# Patient Record
Sex: FEMALE | Race: WHITE
[De-identification: names, ages, dates, MRNs, and addresses within clinical notes are randomized per-mention and may not be internally consistent; named-entity substitution may affect disease eponyms.]

---

## 2017-01-01 ENCOUNTER — HOSPITAL ENCOUNTER (EMERGENCY)
Dept: HOSPITAL 17 - PHED | Age: 67
Discharge: HOME | End: 2017-01-01
Payer: MEDICARE

## 2017-01-01 VITALS — HEIGHT: 63 IN | WEIGHT: 145.51 LBS | BODY MASS INDEX: 25.78 KG/M2

## 2017-01-01 VITALS
RESPIRATION RATE: 18 BRPM | DIASTOLIC BLOOD PRESSURE: 83 MMHG | OXYGEN SATURATION: 95 % | HEART RATE: 78 BPM | TEMPERATURE: 98.5 F | SYSTOLIC BLOOD PRESSURE: 157 MMHG

## 2017-01-01 DIAGNOSIS — B34.9: Primary | ICD-10-CM

## 2017-01-01 DIAGNOSIS — I10: ICD-10-CM

## 2017-01-01 DIAGNOSIS — J44.9: ICD-10-CM

## 2017-01-01 DIAGNOSIS — I48.91: ICD-10-CM

## 2017-01-01 PROCEDURE — 99283 EMERGENCY DEPT VISIT LOW MDM: CPT

## 2017-01-01 NOTE — PD
HPI


Chief Complaint:  Cold / Flu Symptoms


Time Seen by Provider:  17:14


Travel History


International Travel<30 days:  No


Contact w/Intl Traveler<30days:  No


Traveled to known affect area:  No





History of Present Illness


HPI


66-year-old female presents to the emergency Department with complaint of body 

aches, chills, cough, nasal congestion since yesterday.  Reports feeling hot 

but cannot report a MAXIMUM TEMPERATURE.  Has history of COPD.  Reports tobacco 

use daily.  Coughing up green/yellow phlegm.  Uses an inhaler daily for COPD.  

Has albuterol inhaler and has been using it with good relief.  Denies wheezing.

  Denies chest pain, shortness of breath.  Denies ear pain.  Reports throat 

irritation with cough.  Cough is worse at night.  Denies abdominal pain, nausea

, vomiting.  Has tried Benadryl with minimal relief of symptoms.  Is requesting 

antibiotics so she does not get pneumonia.  Dr. Vernon is primary care 

provider.  No other modifying factors or associated signs and symptoms.





PFSH


Past Medical History


Hx Anticoagulant Therapy:  Yes (ASA 81mg)


Arthritis:  No


Asthma:  No


Autoimmune Disease:  No


Anxiety:  Yes (PANIC ATTACKS)


Depression:  No


Heart Rhythm Problems:  Yes (ATRIAL FIBRILLATION)


Cancer:  No


Cardiac Catheterization:  Yes


Cardiovascular Problems:  Yes


High Cholesterol:  Yes


Chemotherapy:  No


Chest Pain:  Yes


Congestive Heart Failure:  No


COPD:  Yes


Cerebrovascular Accident:  No


Coronary Artery Disease:  No


Diabetes:  No


Diminished Hearing:  No


Endocrine:  No


Gastrointestinal Disorders:  No


GERD:  No


Genitourinary:  No


Headaches:  No


Hiatal Hernia:  No


Hypertension:  Yes


Immune Disorder:  No


Implanted Vascular Access Dvce:  No


Kidney Stones:  No


Musculoskeletal:  Yes


Neurologic:  No


Psychiatric:  Yes


Reproductive:  Yes (HYSTERECTOMY)


Respiratory:  Yes


Immunizations Current:  Yes


Migraines:  No


Radiation Therapy:  No


Renal Failure:  No


Seizures:  No


Sickle Cell Disease:  No


Sleep Apnea:  No


Thyroid Disease:  No


Ulcer:  No


Tetanus Vaccination:  < 5 Years


Influenza Vaccination:  Yes


Menopausal:  Yes





Past Surgical History


Abdominal Surgery:  No


AICD:  No


Arteriovenous Shunt:  No


Cardiac Surgery:  No


Coronary Stent:  Yes (X's 2)


Ear Surgery:  No


Endocrine Surgery:  No


Eye Surgery:  No


Genitourinary Surgery:  No


Gynecologic Surgery:  Yes (HYSTERECTOMY)


Hysterectomy:  Yes


Insulin Pump:  No


Joint Replacement:  No


Neurologic Surgery:  No


Oral Surgery:  No


Pacemaker:  No


Thoracic Surgery:  No


Other Surgery:  Yes (pylonidal cyst/NOSE RECONSTRUCTION)





Social History


Alcohol Use:  Yes (Soc.)


Tobacco Use:  Yes (1/2 PPD)


Substance Use:  No





Allergies-Medications


(Allergen,Severity, Reaction):  


Coded Allergies:  


     No Known Allergies (Unverified , 1/1/17)


Reported Meds & Prescriptions





Reported Meds & Active Scripts


Active


Tessalon Perles (Benzonatate) 100 Mg Cap 100 Mg PO TID PRN


Nasonex Nasal Spray (Mometasone Furoate) 50 Mcg/Act Naspr 2 [place] EACH NARE 

DAILY PRN


Deltasone (Prednisone) 20 Mg Tab 40 Mg PO DAILY 4 Days


     start 1/2/2017


Reported


Losartan (Losartan Potassium) 50 Mg Tab 50 Mg PO DAILY


Paroxetine (Paroxetine HCl) 10 Mg Tab 10 Mg PO BID


Hydrochlorothiazide 25 Mg Tab 25 Mg PO DAILY


Klor-Con 10 (Potassium Chloride) 10 Meq Tab 10 PO DAILY


Carvedilol 12.5 Mg Tab 12.5 Mg PO BID








Review of Systems


Except as stated in HPI:  all other systems reviewed are Neg





Physical Exam


Narrative


GENERAL: Well-nourished, well-developed  female patient, in no acute 

distress


SKIN: Warm and dry.  No rash.


HEAD: Atraumatic. Normocephalic. 


EYES: Pupils equal and round at 3 mm with brisk reaction. No scleral icterus. 

No injection or drainage.  PERRLA. 


ENT: Mucosa pink and moist. No erythema or exudates. No uvular edema. No uvular

, palatal, or tonsillar deviation.  Airway patent.  


EARS: Bilateral pinnae and external canals appear within normal limits.  

Bilateral tympanic membranes without  erythema, dullness or perforation. 


NECK: Trachea midline.  No lymphadenopathy.  


CARDIOVASCULAR: Regular rate and rhythm.  No murmur appreciated.   


RESPIRATORY: No accessory muscle use. Clear to auscultation. Breath sounds 

equal bilaterally. 


GASTROINTESTINAL: Abdomen soft, non-tender, nondistended. Hepatic and splenic 

margins not palpable.  Bowel sounds are active 4 quadrants.  


MUSCULOSKELETAL: No obvious deformities. No clubbing.  No cyanosis.  No edema. 


NEUROLOGICAL: Awake and alert.  Oriented 3.  No obvious cranial nerve 

deficits.  Motor grossly within normal limits. Normal speech.  Moves all 

extremities.  5/5 strength to all extremities.


PSYCHIATRIC: Appropriate mood and affect; insight and judgment normal.





Data


Data


Last Documented VS





Vital Signs








  Date Time  Temp Pulse Resp B/P Pulse Ox O2 Delivery O2 Flow Rate FiO2


 


1/1/17 14:14 98.5 78 18 157/83 95   








Orders





 Prednisone (Deltasone) (1/1/17 17:30)








MDM


Medical Decision Making


Medical Screen Exam Complete:  Yes


Emergency Medical Condition:  Yes


Medical Record Reviewed:  Yes


Differential Diagnosis


Viral illness, acute bronchitis, COPD exacerbation, influenza


Narrative Course


66-year-old female a chair and physical exam consistent with viral illness.  

She is afebrile and nontoxic-appearing.  She is in no acute distress and her 

lung sounds are clear and equal throughout.  Oxygen saturations 95% on room air 

and she is without retractions or tachypnea.  History of COPD.  Daily tobacco 

use.  Uses it daily inhaler and an albuterol inhaler for COPD.  Discussed viral 

illness and symptom management with patient; he showed requesting antibiotics 

for home.  I will give the patient a prescription for azithromycin as 

requested.  Patient has albuterol inhaler at home.  Deltasone administered in 

the ER.  Azithromycin, Deltasone, Nasonex, Tessalon Perles prescribed for home.

  Patient is medically cleared and stable for discharge.  Discussed reasons to 

return to the emergency department.  Instructed patient to follow up with 

primary care provider.  Patient agrees with treatment plan.  The patients vital 

signs are stable and the patient is stable for outpatient follow-up and 

treatment.  Patient discharged home, stable and in no acute distress.





Diagnosis





 Primary Impression:  


 Viral illness


Referrals:  


Primary Care Physician


Patient Instructions:  Acute Bronchitis (ED), Cold Symptoms (ED), General 

Instructions





***Additional Instructions:


Ibuprofen or Tylenol as directed and as needed to reduce fever


Use home inhalers as instructed and as needed for shortness of breath/wheezing


Oral steroids as prescribed


Over-the-counter antihistamines or decongestants as directed and as needed for 

symptom management


Get plenty of sleep/rest


Drink plenty of fluids to prevent dehydration


[place] diet to encourage nutrition such as crackers, fruit, applesauce, toast, 

soup etc.


Use an air humidifier/turn off ceiling fans


Follow-up with your primary care provider within 1-2 days


Return immediately to the emergency department with worsening of symptoms


***Med/Other Pt SpecificInfo:  Prescription(s) given


Scripts


Azithromycin 500 Mg [id number] Mg PO DAILY  #5 TAB  Ref 0


   Prov:Nessa Tobar ARNP         1/1/17 


Benzonatate (Tessalon Perles)100 Mg [id number] Mg PO TID PRN (COUGH) #21 CAP  Ref 0


   Prov:Nessa Tobar         1/1/17 


Mometasone Nasal Spray (Nasonex Nasal Spray)50 Mcg/Act Naspr2 [place] EACH NARE 

DAILY PRN (NASAL CONGESTION) #1 BOTTLE  Ref 0


   Prov:Nessa Tobar         1/1/17 


Prednisone (Deltasone)20 Mg Tab40 Mg PO DAILY  4 Days  Ref 0


   start 1/2/2017


   Prov:Nessa Tobar         1/1/17


Disposition:  01 DISCHARGE HOME


Condition:  Stable








Nessa Tobar Jan 1, 2017 17:18

## 2017-01-30 ENCOUNTER — HOSPITAL ENCOUNTER (EMERGENCY)
Dept: HOSPITAL 17 - PHED | Age: 67
Discharge: HOME | End: 2017-01-30
Payer: MEDICARE

## 2017-01-30 VITALS
OXYGEN SATURATION: 91 % | SYSTOLIC BLOOD PRESSURE: 157 MMHG | RESPIRATION RATE: 18 BRPM | DIASTOLIC BLOOD PRESSURE: 81 MMHG | HEART RATE: 86 BPM

## 2017-01-30 VITALS
RESPIRATION RATE: 18 BRPM | OXYGEN SATURATION: 92 % | DIASTOLIC BLOOD PRESSURE: 82 MMHG | SYSTOLIC BLOOD PRESSURE: 142 MMHG | HEART RATE: 91 BPM | TEMPERATURE: 98 F

## 2017-01-30 VITALS — HEIGHT: 64 IN | WEIGHT: 135.58 LBS | BODY MASS INDEX: 23.15 KG/M2

## 2017-01-30 VITALS
DIASTOLIC BLOOD PRESSURE: 92 MMHG | RESPIRATION RATE: 16 BRPM | SYSTOLIC BLOOD PRESSURE: 153 MMHG | OXYGEN SATURATION: 93 % | HEART RATE: 81 BPM

## 2017-01-30 VITALS — OXYGEN SATURATION: 94 %

## 2017-01-30 DIAGNOSIS — Z87.42: ICD-10-CM

## 2017-01-30 DIAGNOSIS — I10: ICD-10-CM

## 2017-01-30 DIAGNOSIS — J44.1: Primary | ICD-10-CM

## 2017-01-30 DIAGNOSIS — Z87.09: ICD-10-CM

## 2017-01-30 DIAGNOSIS — R05: ICD-10-CM

## 2017-01-30 DIAGNOSIS — Z86.59: ICD-10-CM

## 2017-01-30 DIAGNOSIS — Z86.79: ICD-10-CM

## 2017-01-30 DIAGNOSIS — Z87.39: ICD-10-CM

## 2017-01-30 DIAGNOSIS — E78.00: ICD-10-CM

## 2017-01-30 LAB
ALP SERPL-CCNC: 90 U/L (ref 45–117)
ALT SERPL-CCNC: 30 U/L (ref 10–53)
ANION GAP SERPL CALC-SCNC: 9 MEQ/L (ref 5–15)
AST SERPL-CCNC: 18 U/L (ref 15–37)
BASOPHILS # BLD AUTO: 0.1 TH/MM3 (ref 0–0.2)
BASOPHILS NFR BLD: 0.8 % (ref 0–2)
BILIRUB SERPL-MCNC: 0.6 MG/DL (ref 0.2–1)
BUN SERPL-MCNC: 17 MG/DL (ref 7–18)
CHLORIDE SERPL-SCNC: 97 MEQ/L (ref 98–107)
EOSINOPHIL # BLD: 0 TH/MM3 (ref 0–0.4)
EOSINOPHIL NFR BLD: 0.4 % (ref 0–4)
ERYTHROCYTE [DISTWIDTH] IN BLOOD BY AUTOMATED COUNT: 12.3 % (ref 11.6–17.2)
GFR SERPLBLD BASED ON 1.73 SQ M-ARVRAT: 75 ML/MIN (ref 89–?)
HCO3 BLD-SCNC: 30.9 MEQ/L (ref 21–32)
HCT VFR BLD CALC: 56.5 % (ref 35–46)
HEMO FLAGS: (no result)
LYMPHOCYTES # BLD AUTO: 1.9 TH/MM3 (ref 1–4.8)
LYMPHOCYTES NFR BLD AUTO: 21.8 % (ref 9–44)
MCH RBC QN AUTO: 33.1 PG (ref 27–34)
MCHC RBC AUTO-ENTMCNC: 33.5 % (ref 32–36)
MCV RBC AUTO: 98.9 FL (ref 80–100)
MONOCYTES NFR BLD: 11.4 % (ref 0–8)
NEUTROPHILS # BLD AUTO: 5.6 TH/MM3 (ref 1.8–7.7)
NEUTROPHILS NFR BLD AUTO: 65.6 % (ref 16–70)
PLATELET # BLD: 213 TH/MM3 (ref 150–450)
POTASSIUM SERPL-SCNC: 3.9 MEQ/L (ref 3.5–5.1)
RBC # BLD AUTO: 5.71 MIL/MM3 (ref 4–5.3)
SODIUM SERPL-SCNC: 137 MEQ/L (ref 136–145)
WBC # BLD AUTO: 8.6 TH/MM3 (ref 4–11)

## 2017-01-30 PROCEDURE — 94640 AIRWAY INHALATION TREATMENT: CPT

## 2017-01-30 PROCEDURE — 84484 ASSAY OF TROPONIN QUANT: CPT

## 2017-01-30 PROCEDURE — 71020: CPT

## 2017-01-30 PROCEDURE — 83880 ASSAY OF NATRIURETIC PEPTIDE: CPT

## 2017-01-30 PROCEDURE — 99285 EMERGENCY DEPT VISIT HI MDM: CPT

## 2017-01-30 PROCEDURE — 80053 COMPREHEN METABOLIC PANEL: CPT

## 2017-01-30 PROCEDURE — 85025 COMPLETE CBC W/AUTO DIFF WBC: CPT

## 2017-01-30 PROCEDURE — 94664 DEMO&/EVAL PT USE INHALER: CPT

## 2017-01-30 PROCEDURE — 96374 THER/PROPH/DIAG INJ IV PUSH: CPT

## 2017-01-30 RX ADMIN — IPRATROPIUM BROMIDE AND ALBUTEROL SULFATE SCH AMPULE: .5; 3 SOLUTION RESPIRATORY (INHALATION) at 10:36

## 2017-01-30 NOTE — PD
HPI


Chief Complaint:  Respiratory Symptoms


Time Seen by Provider:  10:10


Travel History


International Travel<30 days:  No


Contact w/Intl Traveler<30days:  No


Traveled to known affect area:  No





History of Present Illness


HPI


Patient is a 66-year-old female who comes in complaining of shortness of 

breath.  She has had 2 courses of antibiotics over the past month, most 

recently finishing a Z-Hair about a week ago.  She says she also finished a 

course of steroids last week.  She has history of COPD and has been taking her 

medications as directed.  She last used her nebulizer last night.  She says she 

woke up this morning and checked her pulse ox which read at 88-91%, and this 

made her nervous.  She says overall she has been feeling better, she has had 

decreased coughing, but she still feels short of breath.  She denies any fever 

or chills.  She denies any leg swelling.  She denies any chest pain.





PFSH


Past Medical History


Hx Anticoagulant Therapy:  No


Arthritis:  No


Asthma:  No


Autoimmune Disease:  No


Anxiety:  Yes (Panic attacks)


Depression:  No


Heart Rhythm Problems:  Yes (AF)


Cancer:  No


Cardiac Catheterization:  Yes


Cardiovascular Problems:  Yes (HTN)


High Cholesterol:  Yes


Chemotherapy:  No


Chest Pain:  Yes


Congestive Heart Failure:  No


COPD:  Yes


Cerebrovascular Accident:  No


Coronary Artery Disease:  No


Diabetes:  No


Diminished Hearing:  No


Endocrine:  No


Gastrointestinal Disorders:  No


GERD:  No


Genitourinary:  No


Headaches:  No


Hiatal Hernia:  No


Hypertension:  Yes


Immune Disorder:  No


Implanted Vascular Access Dvce:  No


Kidney Stones:  No


Musculoskeletal:  Yes


Neurologic:  No


Psychiatric:  Yes


Reproductive:  Yes (HYSTERECTOMY)


Respiratory:  Yes (COPD)


Immunizations Current:  Yes


Migraines:  No


Radiation Therapy:  No


Renal Failure:  No


Seizures:  No


Sickle Cell Disease:  No


Sleep Apnea:  No


Thyroid Disease:  No


Ulcer:  No


Tetanus Vaccination:  < 5 Years


Influenza Vaccination:  Yes


Pregnant?:  Not Pregnant


Menopausal:  Yes





Past Surgical History


Abdominal Surgery:  No


AICD:  No


Arteriovenous Shunt:  No


Cardiac Surgery:  No


Coronary Stent:  Yes (X's 2)


Ear Surgery:  No


Endocrine Surgery:  No


Eye Surgery:  No


Genitourinary Surgery:  No


Gynecologic Surgery:  Yes (HYSTERECTOMY)


Hysterectomy:  Yes


Insulin Pump:  No


Joint Replacement:  No


Neurologic Surgery:  No


Oral Surgery:  No


Pacemaker:  No


Thoracic Surgery:  No


Other Surgery:  Yes (Pilonidal cyst removal, nose recon. )





Social History


Alcohol Use:  Yes (Occ.)


Tobacco Use:  No


Substance Use:  No





Allergies-Medications


(Allergen,Severity, Reaction):  


Coded Allergies:  


     No Known Allergies (Unverified , 1/30/17)


Reported Meds & Prescriptions





Reported Meds & Active Scripts


Active


Medrol Dosepak (Methylprednisolone) 4 Mg Dspk 4 Mg PO AS DIRECTED


     Per Pharmacist direction


Reported


Wellbutrin SR 12 HR (Bupropion HCl) 100 Mg Tab 100 Mg PO Q12HR


Losartan (Losartan Potassium) 50 Mg Tab 50 Mg PO DAILY


Hydrochlorothiazide 25 Mg Tab 25 Mg PO DAILY


Klor-Con 10 (Potassium Chloride) 10 Meq Tab 10 PO DAILY


Carvedilol 12.5 Mg Tab 12.5 Mg PO BID








Review of Systems


Except as stated in HPI:  all other systems reviewed are Neg


General / Constitutional:  No: Fever, Chills


HENT:  No: Headaches, Lightheadedness


Cardiovascular:  No: Chest Pain or Discomfort


Respiratory:  Positive: Shortness of Breath,  No: Cough


Gastrointestinal:  No: Nausea, Vomiting


Genitourinary:  No: Dysuria


Musculoskeletal:  No: Edema, Pain


Skin:  No Rash, No Change in Pigmentation


Neurologic:  No: Weakness, Dizziness





Physical Exam


Narrative


GENERAL: Awake and alert in no acute distress.


SKIN: Warm and dry.


HEAD: Atraumatic. Normocephalic. 


EYES: Pupils equal and round. No scleral icterus. 


ENT: Mucous membranes pink and moist.


NECK: Trachea midline. No JVD. 


CARDIOVASCULAR: Regular rate and rhythm.  No murmur appreciated.


RESPIRATORY: No accessory muscle use.  Breath sounds decreased bilaterally.  No 

rales or crackles.


GASTROINTESTINAL: Abdomen soft, non-tender, nondistended. 


MUSCULOSKELETAL: No obvious deformities. No clubbing.  No cyanosis.  No edema. 


NEUROLOGICAL: Awake and alert. No obvious cranial nerve deficits.  Motor 

grossly within normal limits. Normal speech.


PSYCHIATRIC: Appropriate mood and affect; insight and judgment normal.





Data


Data


Last Documented VS





Vital Signs








  Date Time  Temp Pulse Resp B/P Pulse Ox O2 Delivery O2 Flow Rate FiO2


 


1/30/17 12:35  81 16 153/92 93 Room Air  


 


1/30/17 11:15       2 


 


1/30/17 09:46 98.0       








Orders





 Complete Blood Count With Diff (1/30/17 10:25)


Comprehensive Metabolic Panel (1/30/17 10:25)


B-Type Natriuretic Peptide (1/30/17 10:25)


Troponin I (1/30/17 10:25)


Iv Access Insert/Monitor (1/30/17 10:25)


Ecg Monitoring (1/30/17 10:25)


Oximetry (1/30/17 10:25)


Oxygen Administration (1/30/17 10:25)


Chest, Pa & Lat (1/30/17 10:25)


Sodium Chloride 0.9% Flush (Ns Flush) (1/30/17 10:30)


Methylprednisolone So Succ Inj (Solumedr (1/30/17 10:30)


Albuterol-Ipratropium Neb (Duoneb Neb) (1/30/17 10:30)





Labs





 Laboratory Tests








Test 1/30/17





 10:40


 


White Blood Count 8.6 TH/MM3


 


Red Blood Count 5.71 MIL/MM3


 


Hemoglobin 18.9 GM/DL


 


Hematocrit 56.5 %


 


Mean Corpuscular Volume 98.9 FL


 


Mean Corpuscular Hemoglobin 33.1 PG


 


Mean Corpuscular Hemoglobin 33.5 %





Concent 


 


Red Cell Distribution Width 12.3 %


 


Platelet Count 213 TH/MM3


 


Mean Platelet Volume 8.4 FL


 


Neutrophils (%) (Auto) 65.6 %


 


Lymphocytes (%) (Auto) 21.8 %


 


Monocytes (%) (Auto) 11.4 %


 


Eosinophils (%) (Auto) 0.4 %


 


Basophils (%) (Auto) 0.8 %


 


Neutrophils # (Auto) 5.6 TH/MM3


 


Lymphocytes # (Auto) 1.9 TH/MM3


 


Monocytes # (Auto) 1.0 TH/MM3


 


Eosinophils # (Auto) 0.0 TH/MM3


 


Basophils # (Auto) 0.1 TH/MM3


 


CBC Comment DIFF FINAL 


 


Differential Comment  


 


Sodium Level 137 MEQ/L


 


Potassium Level 3.9 MEQ/L


 


Chloride Level 97 MEQ/L


 


Carbon Dioxide Level 30.9 MEQ/L


 


Anion Gap 9 MEQ/L


 


Blood Urea Nitrogen 17 MG/DL


 


Creatinine 0.77 MG/DL


 


Estimat Glomerular Filtration 75 ML/MIN





Rate 


 


Random Glucose 132 MG/DL


 


Calcium Level 9.5 MG/DL


 


Total Bilirubin 0.6 MG/DL


 


Aspartate Amino Transf 18 U/L





(AST/SGOT) 


 


Alanine Aminotransferase 30 U/L





(ALT/SGPT) 


 


Alkaline Phosphatase 90 U/L


 


Troponin I LESS THAN 0.02





 NG/ML


 


B-Type Natriuretic Peptide 33 PG/ML


 


Total Protein 8.4 GM/DL


 


Albumin 4.3 GM/DL











MDM


Medical Decision Making


Medical Screen Exam Complete:  Yes


Emergency Medical Condition:  Yes


Medical Record Reviewed:  Yes


Differential Diagnosis


COPD exacerbation versus pneumonia versus bronchitis versus anxiety


Narrative Course


Patient is a 66-year-old female who comes in complaining of shortness of 

breath.  Exam shows decreased at sounds both lungs.  IV established, labs sent.

  Chest x-ray performed shows hyperinflated lungs consistent with COPD.





Labs show a hemoglobin of 18, which is also consistent with COPD.





Patient given 3 duo nebs as well as a dose of Solu-Medrol.  She reports feeling 

much better.  Oxygen saturation has ranged from 92-95% on room air.





I spoke with the patient's primary care physician, Dr. Vernon, he will see her 

in the office tomorrow morning.  Patient will be given prescription for Medrol 

Dosepak.  Advised follow-up with Dr. Vernon tomorrow.  Advised to return to 

the ED at any time if her symptoms are worsening.





Diagnosis





 Primary Impression:  


 COPD exacerbation


Patient Instructions:  COPD (Chronic Obstructive Pulmonary Disease) (ED), 

General Instructions





***Additional Instructions:


Follow up with Dr. Vernon tomorrow morning.  Use your nebulizer every 4 hours 

for the next day.  Take the steroids starting tomorrow as you already had a 

dose today.  Return to the ED as needed for any worsening symptoms.


Scripts


Methylprednisolone Dosepak (Medrol Dosepak)4 Mg Dspk4 Mg PO AS DIRECTED  #1 

DSPK  Ref 0


   Per Pharmacist direction


   Prov:Wendy Hobson MD         1/30/17


Disposition:  01 DISCHARGE HOME


Condition:  Stable








Wendy Hobson MD Jan 30, 2017 12:26

## 2017-01-30 NOTE — RADHPO
EXAM DATE/TIME:  01/30/2017 11:23 

 

[facility] COMPARISON:     

CHEST SINGLE AP, April 02, 2016, 0:00.

 

                     

INDICATIONS :     

Short of breath

                     

 

MEDICAL HISTORY :     

Chronic obstructive pulmonary disease.          

 

SURGICAL HISTORY :        

Cardiac stent x 2

 

ENCOUNTER:     

Initial                                        

 

ACUITY:     

1 month      

 

PAIN SCORE:     

0/10

 

LOCATION:     

Bilateral chest 

 

FINDINGS:     

PA and lateral views of the chest demonstrate the lungs to be hyperaerated consistent with COPD witho
ut evidence of mass, infiltrate or effusion.  The cardiomediastinal contours are unremarkable.  [place]
us structures are intact.

 

CONCLUSION:     

Hyperaeration consistent with COPD. No acute cardiopulmonary process

 

 

 

 Mohan Pedraza MD on January 30, 2017 at 11:35           

Board Certified Radiologist.

 This report was verified electronically.

## 2017-11-19 ENCOUNTER — HOSPITAL ENCOUNTER (EMERGENCY)
Dept: HOSPITAL 17 - PHEFT | Age: 67
Discharge: HOME | End: 2017-11-19
Payer: MEDICARE

## 2017-11-19 VITALS — BODY MASS INDEX: 26.17 KG/M2 | WEIGHT: 147.71 LBS | HEIGHT: 63 IN

## 2017-11-19 VITALS
OXYGEN SATURATION: 95 % | SYSTOLIC BLOOD PRESSURE: 175 MMHG | HEART RATE: 80 BPM | RESPIRATION RATE: 16 BRPM | DIASTOLIC BLOOD PRESSURE: 77 MMHG | TEMPERATURE: 98.2 F

## 2017-11-19 DIAGNOSIS — J44.9: Primary | ICD-10-CM

## 2017-11-19 DIAGNOSIS — Z72.0: ICD-10-CM

## 2017-11-19 DIAGNOSIS — I10: ICD-10-CM

## 2017-11-19 PROCEDURE — 99284 EMERGENCY DEPT VISIT MOD MDM: CPT

## 2017-11-19 NOTE — PD
HPI


Chief Complaint:  Cold / Flu Symptoms


Time Seen by Provider:  12:36


Travel History


International Travel<30 days:  No


Contact w/Intl Traveler<30days:  No


Traveled to known affect area:  No





History of Present Illness


HPI


66 old female with history of COPD here for evaluation of productive cough 3 

days.  Patient reports symptoms started approximately 5-7 days ago has mild URI 

symptoms and progressed to this harsh cough.  She reports chills and mild 

shortness of breath with coughing episodes.  Symptom severity is moderate.  No 

alleviating factors.





PFSH


Past Medical History


Hx Anticoagulant Therapy:  No


Arthritis:  No


Asthma:  No


Autoimmune Disease:  No


Anxiety:  Yes (Panic attacks)


Depression:  No


Heart Rhythm Problems:  Yes (AF)


Cancer:  No


Cardiac Catheterization:  Yes


Cardiovascular Problems:  Yes (HTN)


High Cholesterol:  Yes


Chemotherapy:  No


Chest Pain:  Yes


Congestive Heart Failure:  No


COPD:  Yes


Cerebrovascular Accident:  No


Coronary Artery Disease:  No


Diabetes:  No


Diminished Hearing:  No


Endocrine:  No


Gastrointestinal Disorders:  No


GERD:  No


Genitourinary:  No


Headaches:  No


Hiatal Hernia:  No


Hypertension:  Yes


Immune Disorder:  No


Implanted Vascular Access Dvce:  No


Kidney Stones:  No


Musculoskeletal:  Yes


Neurologic:  No


Psychiatric:  Yes


Reproductive:  Yes (HYSTERECTOMY)


Respiratory:  Yes (COPD)


Immunizations Current:  Yes


Migraines:  No


Radiation Therapy:  No


Renal Failure:  No


Seizures:  No


Sickle Cell Disease:  No


Sleep Apnea:  No


Thyroid Disease:  No


Ulcer:  No


Influenza Vaccination:  Yes


Pregnant?:  Not Pregnant


Menopausal:  Yes





Past Surgical History


Abdominal Surgery:  No


AICD:  No


Arteriovenous Shunt:  No


Cardiac Surgery:  No


Coronary Stent:  Yes (X's 2)


Ear Surgery:  No


Endocrine Surgery:  No


Eye Surgery:  No


Genitourinary Surgery:  No


Gynecologic Surgery:  Yes (HYSTERECTOMY)


Hysterectomy:  Yes


Insulin Pump:  No


Joint Replacement:  No


Neurologic Surgery:  No


Oral Surgery:  No


Pacemaker:  No


Thoracic Surgery:  No


Other Surgery:  Yes (Pilonidal cyst removal, nose recon. )





Social History


Alcohol Use:  Yes (Occ.)


Tobacco Use:  Yes


Substance Use:  No





Allergies-Medications


(Allergen,Severity, Reaction):  


Coded Allergies:  


     No Known Allergies (Unverified  Adverse Reaction, Unknown, 11/19/17)


Reported Meds & Prescriptions





Reported Meds & Active Scripts


Active


Reported


Wellbutrin SR 12 HR (Bupropion HCl) 100 Mg Tab 100 Mg PO Q12HR


Losartan (Losartan Potassium) 50 Mg Tab 50 Mg PO DAILY


Hydrochlorothiazide 25 Mg Tab 25 Mg PO DAILY


Klor-Con 10 (Potassium Chloride) 10 Meq Tab 10 PO DAILY


Carvedilol 12.5 Mg Tab 12.5 Mg PO BID








Review of Systems


Except as stated in HPI:  all other systems reviewed are Neg


General / Constitutional:  Positive: Chills


Respiratory:  Positive: Cough





Physical Exam


Narrative


GENERAL: Well-nourished, well-developed patient.


SKIN: Focused skin assessment warm/dry.


HEAD: Normocephalic.


EYES: No scleral icterus. No injection or drainage. 


NECK: Supple, trachea midline. No JVD or lymphadenopathy.


CARDIOVASCULAR: Regular rate and rhythm without murmurs, gallops, or rubs. 


RESPIRATORY: Breath sounds equal bilaterally. No accessory muscle use.  Harsh 

sounding cough with questionable rhonchi


GASTROINTESTINAL: Abdomen soft, non-tender, nondistended.





Data


Data


Last Documented VS





Vital Signs








  Date Time  Temp Pulse Resp B/P (MAP) Pulse Ox O2 Delivery O2 Flow Rate FiO2


 


11/19/17 12:13 98.2 80 16 175/77 (109) 95   











MDM


Medical Decision Making


Medical Screen Exam Complete:  Yes


Emergency Medical Condition:  Yes


Differential Diagnosis


Pneumonia, bronchitis, influenza


Narrative Course


66-year-old female with history COPD here with productive cough and chills 3 

days.  On exam patient has harsh sounding cough and question rhonchi.  Her 

vital signs are stable.  She is nontoxic appearing.





Diagnosis





 Primary Impression:  


 Bronchitis


Referrals:  


Primary Care Physician


Scripts


Prednisone (Prednisone) 20 Mg Tab


40 MG PO DAILY, #10 TAB 0 Refills


   Take 40 mg (2 tablets) daily for 5 days


   Prov: Rachel Herr         11/19/17 


Azithromycin (Azithromycin) 250 Mg Tab


250 MG PO AS DIRECTED for Infection, #6 TAB 0 Refills


   Take 2 tabs (500 mg) on day 1 then 1 tab daily x 4 days.


   Prov: Rachel Herr         11/19/17


Disposition:  01 DISCHARGE HOME


Condition:  Stable











Rachel Herr Nov 19, 2017 12:59

## 2018-03-28 ENCOUNTER — HOSPITAL ENCOUNTER (EMERGENCY)
Dept: HOSPITAL 17 - PHED | Age: 68
Discharge: HOME | End: 2018-03-28
Payer: MEDICARE

## 2018-03-28 VITALS
DIASTOLIC BLOOD PRESSURE: 76 MMHG | OXYGEN SATURATION: 98 % | SYSTOLIC BLOOD PRESSURE: 163 MMHG | RESPIRATION RATE: 16 BRPM | TEMPERATURE: 99.3 F | HEART RATE: 83 BPM

## 2018-03-28 VITALS — HEIGHT: 63 IN | BODY MASS INDEX: 24.92 KG/M2 | WEIGHT: 140.65 LBS

## 2018-03-28 DIAGNOSIS — Z79.899: ICD-10-CM

## 2018-03-28 DIAGNOSIS — F43.10: ICD-10-CM

## 2018-03-28 DIAGNOSIS — I10: ICD-10-CM

## 2018-03-28 DIAGNOSIS — F17.200: ICD-10-CM

## 2018-03-28 DIAGNOSIS — J44.9: ICD-10-CM

## 2018-03-28 DIAGNOSIS — M70.62: Primary | ICD-10-CM

## 2018-03-28 DIAGNOSIS — I25.2: ICD-10-CM

## 2018-03-28 DIAGNOSIS — I48.91: ICD-10-CM

## 2018-03-28 DIAGNOSIS — E78.00: ICD-10-CM

## 2018-03-28 PROCEDURE — 73502 X-RAY EXAM HIP UNI 2-3 VIEWS: CPT

## 2018-03-28 PROCEDURE — 96372 THER/PROPH/DIAG INJ SC/IM: CPT

## 2018-03-28 PROCEDURE — 99283 EMERGENCY DEPT VISIT LOW MDM: CPT

## 2018-03-28 NOTE — RADRPT
EXAM DATE/TIME:  03/28/2018 17:28 

 

[facility] COMPARISON:     

No previous studies available for comparison.

 

                     

INDICATIONS :     

Left hip pain for 3 weeks with no known injury

                     

 

MEDICAL HISTORY :     

None.          

 

SURGICAL HISTORY :     

None.   

 

ENCOUNTER:     

Initial                                        

 

ACUITY:     

3 weeks      

 

PAIN SCORE:     

10/10

 

LOCATION:     

Left  entire hip

 

FINDINGS:     

Examination of the left hip was performed with AP Pelvis.  The primary and secondary trabecular patte
rn of the femoral neck is intact.  The hip joint is of normal width without significant sclerosis or 
bony hypertrophy.  The acetabulum is grossly intact. There is good alignment of the SI joints and pub
ic symphysis. No arthropathy is demonstrated.

 

CONCLUSION:     

Unremarkable exam for patient's age.

 

 

 

 Logan Mann MD on March 28, 2018 at 17:40           

Board Certified Radiologist.

 This report was verified electronically.

## 2018-05-26 ENCOUNTER — HOSPITAL ENCOUNTER (EMERGENCY)
Dept: HOSPITAL 17 - PHED | Age: 68
Discharge: HOME | End: 2018-05-26
Payer: MEDICARE

## 2018-05-26 VITALS — HEIGHT: 63 IN | BODY MASS INDEX: 25.39 KG/M2 | WEIGHT: 143.3 LBS

## 2018-05-26 VITALS
OXYGEN SATURATION: 95 % | DIASTOLIC BLOOD PRESSURE: 73 MMHG | TEMPERATURE: 98.4 F | RESPIRATION RATE: 16 BRPM | HEART RATE: 85 BPM | SYSTOLIC BLOOD PRESSURE: 143 MMHG

## 2018-05-26 DIAGNOSIS — E78.00: ICD-10-CM

## 2018-05-26 DIAGNOSIS — I25.2: ICD-10-CM

## 2018-05-26 DIAGNOSIS — J44.9: ICD-10-CM

## 2018-05-26 DIAGNOSIS — I10: ICD-10-CM

## 2018-05-26 DIAGNOSIS — F41.0: ICD-10-CM

## 2018-05-26 DIAGNOSIS — F43.10: ICD-10-CM

## 2018-05-26 DIAGNOSIS — F17.200: ICD-10-CM

## 2018-05-26 DIAGNOSIS — I48.91: ICD-10-CM

## 2018-05-26 DIAGNOSIS — J01.00: Primary | ICD-10-CM

## 2018-05-26 PROCEDURE — 99283 EMERGENCY DEPT VISIT LOW MDM: CPT

## 2018-05-26 NOTE — PD
HPI


Chief Complaint:  ENT Complaint


Time Seen by Provider:  14:26


Travel History


International Travel<30 days:  No


Contact w/Intl Traveler<30days:  No


Traveled to known affect area:  No





History of Present Illness


HPI


67-year-old female complains of facial pain facial pressure postnasal drip, 

earache sore throat coughing congestion.  Patient states that his symptoms 

started about a week ago.  Patient denies any fever chills.  Patient states her 

cough is intermittent and mildly productive.  Patient denies any chest pain 

shortness of breath.  Patient denies abdominal pain.  Patient denies any nausea 

vomiting diarrhea.





PFSH


Past Medical History


Hx Anticoagulant Therapy:  No


Arthritis:  No


Asthma:  No


Autoimmune Disease:  No


Anxiety:  Yes (Panic attacks)


Depression:  No


Heart Rhythm Problems:  Yes (AF)


Cancer:  No


Cardiac Catheterization:  Yes


Cardiovascular Problems:  Yes (MI)


High Cholesterol:  Yes


Chemotherapy:  No


Chest Pain:  Yes


Congestive Heart Failure:  No


COPD:  Yes


Cerebrovascular Accident:  No


Coronary Artery Disease:  No


Diabetes:  No


Diminished Hearing:  No


Endocrine:  No


Gastrointestinal Disorders:  No


GERD:  No


Genitourinary:  No


Headaches:  No


Hiatal Hernia:  No


Heparin Induced Thrombocytopen:  No


Hypertension:  Yes


Immune Disorder:  No


Implanted Vascular Access Dvce:  No


Kidney Stones:  No


Musculoskeletal:  Yes


Neurologic:  No


Psychiatric:  Yes (PTSD)


Reproductive:  Yes (HYSTERECTOMY)


Respiratory:  Yes (COPD)


Immunizations Current:  Yes


Migraines:  No


Myocardial Infarction:  Yes


Radiation Therapy:  No


Renal Failure:  No


Seizures:  No


Sickle Cell Disease:  No


Sleep Apnea:  No


Thyroid Disease:  No


Ulcer:  No


Tetanus Vaccination:  Unknown


Pregnant?:  Not Pregnant


Menopausal:  Yes





Past Surgical History


Abdominal Surgery:  No


AICD:  No


Arteriovenous Shunt:  No


Cardiac Surgery:  No


Coronary Stent:  Yes (X's 2)


Ear Surgery:  No


Endocrine Surgery:  No


Eye Surgery:  No


Genitourinary Surgery:  No


Gynecologic Surgery:  Yes (HYSTERECTOMY)


Hysterectomy:  Yes


Insulin Pump:  No


Joint Replacement:  No


Neurologic Surgery:  No


Oral Surgery:  No


Pacemaker:  No


Thoracic Surgery:  No


Other Surgery:  Yes (Pilonidal cyst removal, nose recon. )





Social History


Alcohol Use:  Yes (Occ.)


Tobacco Use:  Yes (1PPD)


Substance Use:  No





Allergies-Medications


(Allergen,Severity, Reaction):  


Coded Allergies:  


     Penicillins (Verified  Allergy, Intermediate, RASH, 5/26/18)


Reported Meds & Prescriptions





Reported Meds & Active Scripts


Active


Reported


Aspirin Children's (Aspirin) 81 Mg Chew 81 Mg CHEW DAILY


Bupropion HCl 100 Mg Tab 200 Mg PO BID


Losartan (Losartan Potassium) 50 Mg Tab 50 Mg PO DAILY


Hydrochlorothiazide 25 Mg Tab 25 Mg PO DAILY


Klor-Con 10 (Potassium Chloride) 10 Meq Tab 10 PO DAILY


Carvedilol 12.5 Mg Tab 12.5 Mg PO BID








Review of Systems


General / Constitutional:  No: Fever


Eyes:  No: Visual changes


HENT:  Positive: Sore Throat, Congestion, Earache, No: Headaches


Cardiovascular:  No: Chest Pain or Discomfort


Respiratory:  Positive: Cough, No: Shortness of Breath


Gastrointestinal:  No: Abdominal Pain


Genitourinary:  No: Dysuria


Musculoskeletal:  No: Pain


Skin:  No Rash


Neurologic:  No: Weakness


Psychiatric:  No: Depression


Endocrine:  No: Polydipsia


Hematologic/Lymphatic:  No: Easy Bruising





Physical Exam


Narrative


GENERAL: Well-nourished, well-developed patient.


SKIN: Focused skin assessment warm/dry.


HEAD: Normocephalic.  Patient has tenderness on palpation of frontal and 

maxillary sinus area.


EYES: No scleral icterus. No injection or drainage.


TM: Clear.


Patient has nasal mucosa erythematous and boggy.


Throat: Mild erythematous.


NECK: Supple, trachea midline. No JVD or lymphadenopathy.  No meningismus


CARDIOVASCULAR: Regular rate and rhythm without murmurs, gallops, or rubs. 


RESPIRATORY: Breath sounds equal bilaterally. No accessory muscle use.


GASTROINTESTINAL: Abdomen soft, non-tender, nondistended. 


MUSCULOSKELETAL: No cyanosis, or edema. 


BACK: Nontender without obvious deformity. No CVA tenderness.





Data


Data


Last Documented VS





Vital Signs








  Date Time  Temp Pulse Resp B/P (MAP) Pulse Ox O2 Delivery O2 Flow Rate FiO2


 


5/26/18 13:44 98.4 85 16 143/73 (96) 95   








Orders





 Orders


Ed Discharge Order (5/26/18 14:26)








MDM


Medical Decision Making


Medical Screen Exam Complete:  Yes


Emergency Medical Condition:  Yes


Differential Diagnosis


Differential diagnosis including URI, otitis media, pharyngitis, sinusitis, 

bronchitis, pneumonia.


Narrative Course


67-year-old female complains of earaches, sore throat, sinus pressure, 

postnasal drip, coughing congestion.





Diagnosis





 Primary Impression:  


 Sinusitis


 Qualified Codes:  J01.00 - Acute maxillary sinusitis, unspecified


Patient Instructions:  General Instructions


Departure Forms:  Tests/Procedures





***Additional Instructions:  


Z-Hair as directed.  Over-the-counter Flonase as directed.  Tylenol ibuprofen 

for aching pain and fever headache.  Follow-up with personal physician.  Return 

if worse.


***Med/Other Pt SpecificInfo:  Prescription(s) given


Scripts


Azithromycin (Zithromax Z-Hair) 250 Mg Dspk


250 MG PO AS DIRECTED for Infection, #1 DSPK 0 Refills


   500 MG (2 tabs) day 1, then 1 tab days 2-5.


   Prov: Daniel Rizvi MD         5/26/18


Disposition:  01 DISCHARGE HOME


Condition:  Stable











Daniel Rizvi MD May 26, 2018 14:31